# Patient Record
Sex: FEMALE | Race: WHITE | URBAN - METROPOLITAN AREA
[De-identification: names, ages, dates, MRNs, and addresses within clinical notes are randomized per-mention and may not be internally consistent; named-entity substitution may affect disease eponyms.]

---

## 2021-11-04 ENCOUNTER — TELEPHONE (OUTPATIENT)
Dept: INTERNAL MEDICINE CLINIC | Age: 51
End: 2021-11-04

## 2021-11-04 NOTE — TELEPHONE ENCOUNTER
Dr. Betty Jones is willing to see patient at end of day on 11/5 in person, patient declined and will keep VV on 12/3.

## 2021-11-04 NOTE — TELEPHONE ENCOUNTER
----- Message from Estella Renae sent at 11/4/2021  1:35 PM EDT -----  Subject: Message to Provider    QUESTIONS  Information for Provider? Dr. oPlina Sawant is aware providers cannot   prescribe meds for a pt if pt has not been seen yet. In follow up to our   prior message; pt is going to be without important psych medications for a   few weeks since the soonest appt available is the 12/3 appt [facility] scheduled   her for. 1. Please add pt to cancellation list or let her know if she can   be seen sooner. 2. Please reach out to pt ASAP & let her know if ER would   be her best bet for emergency med coverage until her new pt appt, or any   other option  ---------------------------------------------------------------------------  --------------  CALL BACK INFO  What is the best way for the office to contact you? OK to leave message on   voicemail  Preferred Call Back Phone Number? [phone]  ---------------------------------------------------------------------------  --------------  SCRIPT ANSWERS  Relationship to Patient? Third Party  Representative Name?  Eleonora ([facility])

## 2021-11-04 NOTE — TELEPHONE ENCOUNTER
----- Message from anfix Showers sent at 11/4/2021  1:12 PM EDT -----  Subject: Message to Provider    QUESTIONS  Information for Provider? Pt has VV on 12/03/2021, she is requesting it is   sent to her email instead of her phone ([email or web address]). ---------------------------------------------------------------------------  --------------  Cuca ANDalyze JANETT  What is the best way for the office to contact you? Do not leave any   message, patient will call back for answer  Preferred Call Back Phone Number? [phone]  ---------------------------------------------------------------------------  --------------  SCRIPT ANSWERS  Relationship to Patient?  Self

## 2021-11-04 NOTE — TELEPHONE ENCOUNTER
----- [facility] sent at 11/4/2021  1:27 PM EDT -----  Subject: Message to Provider    QUESTIONS  Information for Provider? URGENT? Dr. Cyndie Ralph, pt is a new pt and is   mentally disabled. She lost her psychiatrist when she lost her old   insurance. Pt is having much trouble finding a new psych in her network, &   needs a PCP who can cover her meds until she has new psych. Pt stating she   went without meds before & it was terrifying, scared of it happening   again. She is getting low, has 4 days left. Sertraline 100mg, Quetiapine   200mg, Clonazepam 2 mg tabs. Please call pt & let her know options for   emergency meds until 12/3 appt.  ---------------------------------------------------------------------------  --------------  CALL BACK INFO  What is the best way for the office to contact you? OK to leave message on   voicemail  Preferred Call Back Phone Number? [phone]  ---------------------------------------------------------------------------  --------------  SCRIPT ANSWERS  Relationship to Patient?  Self

## 2021-11-05 ENCOUNTER — TELEPHONE (OUTPATIENT)
Dept: INTERNAL MEDICINE CLINIC | Age: 51
End: 2021-11-05

## 2021-11-05 NOTE — TELEPHONE ENCOUNTER
----- Message from Johnny Muse sent at 11/5/2021  4:45 PM EDT -----  Subject: Appointment Request    Reason for Call: New Patient Request Appointment    QUESTIONS  Type of Appointment? New Patient/New to Provider  Reason for appointment request? Available appointments did not meet   patient need  Additional Information for Provider? Pt has appt scheduled for 12/03/21 to   est. care and med check. Pt has anxiety/PTSD and is worried about running   out of meds and would like an appt sooner if possible.  ---------------------------------------------------------------------------  --------------  CALL BACK INFO  What is the best way for the office to contact you? OK to leave message on   voicemail  Preferred Call Back Phone Number? [phone]  ---------------------------------------------------------------------------  --------------  SCRIPT ANSWERS  Relationship to Patient? Self  Have your symptoms changed? No  Have you been diagnosed with, awaiting test results for, or told that you   are suspected of having COVID-19 (Coronavirus)? (If patient has tested   negative or was tested as a requirement for work, school, or travel and   not based on symptoms, answer no)? No  Within the past two weeks have you developed any of the following symptoms   (answer no if symptoms have been present longer than 2 weeks or began   more than 2 weeks ago)? Fever or Chills, Cough, Shortness of breath or   difficulty breathing, Loss of taste or smell, Sore throat, Nasal   congestion, Sneezing or runny nose, Fatigue or generalized body aches   (answer no if pain is specific to a body part e.g. back pain), Diarrhea,   Headache? No  Have you had close contact with someone with COVID-19 in the last 14 days? No  (Service Expert  click yes below to proceed with [employer] As Usual   Scheduling)?  Yes

## 2022-02-08 ENCOUNTER — OFFICE VISIT (OUTPATIENT)
Dept: INTERNAL MEDICINE CLINIC | Age: 52
End: 2022-02-08
Payer: MEDICARE

## 2022-02-08 VITALS
RESPIRATION RATE: 18 BRPM | HEIGHT: 62 IN | HEART RATE: 110 BPM | WEIGHT: 216 LBS | SYSTOLIC BLOOD PRESSURE: 133 MMHG | DIASTOLIC BLOOD PRESSURE: 85 MMHG | BODY MASS INDEX: 39.75 KG/M2 | TEMPERATURE: 98.5 F | OXYGEN SATURATION: 95 %

## 2022-02-08 DIAGNOSIS — R35.0 URINE FREQUENCY: Primary | ICD-10-CM

## 2022-02-08 DIAGNOSIS — F33.2 SEVERE EPISODE OF RECURRENT MAJOR DEPRESSIVE DISORDER, WITHOUT PSYCHOTIC FEATURES (HCC): ICD-10-CM

## 2022-02-08 DIAGNOSIS — R68.89 OTHER GENERAL SYMPTOMS AND SIGNS: ICD-10-CM

## 2022-02-08 DIAGNOSIS — M89.9 DISORDER OF BONE, UNSPECIFIED: ICD-10-CM

## 2022-02-08 DIAGNOSIS — Z00.00 WELL ADULT HEALTH CHECK: ICD-10-CM

## 2022-02-08 DIAGNOSIS — Z98.84 HISTORY OF GASTRIC BYPASS: ICD-10-CM

## 2022-02-08 DIAGNOSIS — F43.10 PTSD (POST-TRAUMATIC STRESS DISORDER): ICD-10-CM

## 2022-02-08 DIAGNOSIS — R21 RASH AND NONSPECIFIC SKIN ERUPTION: ICD-10-CM

## 2022-02-08 DIAGNOSIS — R79.9 ABNORMAL FINDING OF BLOOD CHEMISTRY, UNSPECIFIED: ICD-10-CM

## 2022-02-08 DIAGNOSIS — F41.0 PANIC DISORDER: ICD-10-CM

## 2022-02-08 LAB
BILIRUB UR QL STRIP: NEGATIVE
GLUCOSE UR-MCNC: NEGATIVE MG/DL
KETONES P FAST UR STRIP-MCNC: NEGATIVE MG/DL
PH UR STRIP: 5 [PH] (ref 4.6–8)
PROT UR QL STRIP: NEGATIVE
SP GR UR STRIP: 1.02 (ref 1–1.03)
UA UROBILINOGEN AMB POC: NORMAL (ref 0.2–1)
URINALYSIS CLARITY POC: CLEAR
URINALYSIS COLOR POC: YELLOW
URINE BLOOD POC: NEGATIVE
URINE LEUKOCYTES POC: NEGATIVE
URINE NITRITES POC: POSITIVE

## 2022-02-08 PROCEDURE — 99204 OFFICE O/P NEW MOD 45 MIN: CPT | Performed by: INTERNAL MEDICINE

## 2022-02-08 PROCEDURE — G9717 DOC PT DX DEP/BP F/U NT REQ: HCPCS | Performed by: INTERNAL MEDICINE

## 2022-02-08 PROCEDURE — 3017F COLORECTAL CA SCREEN DOC REV: CPT | Performed by: INTERNAL MEDICINE

## 2022-02-08 PROCEDURE — G8417 CALC BMI ABV UP PARAM F/U: HCPCS | Performed by: INTERNAL MEDICINE

## 2022-02-08 PROCEDURE — G8427 DOCREV CUR MEDS BY ELIG CLIN: HCPCS | Performed by: INTERNAL MEDICINE

## 2022-02-08 PROCEDURE — 81002 URINALYSIS NONAUTO W/O SCOPE: CPT | Performed by: INTERNAL MEDICINE

## 2022-02-08 RX ORDER — SERTRALINE HYDROCHLORIDE 100 MG/1
200 TABLET, FILM COATED ORAL DAILY
Qty: 180 TABLET | Refills: 0 | Status: SHIPPED | OUTPATIENT
Start: 2022-02-08 | End: 2022-04-24

## 2022-02-08 RX ORDER — QUETIAPINE FUMARATE 200 MG/1
200 TABLET, FILM COATED ORAL 3 TIMES DAILY
Qty: 270 TABLET | Refills: 0 | Status: SHIPPED | OUTPATIENT
Start: 2022-02-08 | End: 2022-04-24

## 2022-02-08 RX ORDER — QUETIAPINE FUMARATE 200 MG/1
TABLET, FILM COATED ORAL
COMMUNITY
Start: 2009-12-02 | End: 2022-02-08 | Stop reason: SDUPTHER

## 2022-02-08 NOTE — PROGRESS NOTES
Lauren Godinez (: 1970) is a 46 y.o. female, new patient, here for evaluation of the following chief complaint(s):  Chief Complaint   Patient presents with    New Patient     rash, urine frequency/pain       Assessment and Plan:       ICD-10-CM ICD-9-CM    1. Urine frequency  R35.0 788.41 AMB POC URINALYSIS DIP STICK MANUAL W/O MICRO   2. Severe episode of recurrent major depressive disorder, without psychotic features ([facility]  F33.2 296.33 sertraline (Zoloft) 100 mg tablet      QUEtiapine (SEROquel) 200 mg tablet      REFERRAL TO BEHAVIORAL HEALTH      REFERRAL TO BEHAVIORAL HEALTH      CBC WITH AUTOMATED DIFF      METABOLIC PANEL, COMPREHENSIVE      IRON PROFILE      FERRITIN      FOLATE      VITAMIN B12      VITAMIN D, 25 HYDROXY      VITAMIN B1, WHOLE BLOOD      VITAMIN B1, WHOLE BLOOD      VITAMIN D, 25 HYDROXY      VITAMIN B12      FOLATE      FERRITIN      IRON PROFILE      METABOLIC PANEL, COMPREHENSIVE      CBC WITH AUTOMATED DIFF   3. Panic disorder  F41.0 300.01 sertraline (Zoloft) 100 mg tablet      QUEtiapine (SEROquel) 200 mg tablet      REFERRAL TO BEHAVIORAL HEALTH      REFERRAL TO BEHAVIORAL HEALTH   4. PTSD (post-traumatic stress disorder)  F43.10 309.81 sertraline (Zoloft) 100 mg tablet      QUEtiapine (SEROquel) 200 mg tablet      REFERRAL TO BEHAVIORAL HEALTH      REFERRAL TO BEHAVIORAL HEALTH   5. Rash and nonspecific skin eruption  R21 782.1 REFERRAL TO DERMATOLOGY   6. History of gastric bypass  Z98.84 V45.86 REFERRAL TO GASTROENTEROLOGY      CBC WITH AUTOMATED DIFF      METABOLIC PANEL, COMPREHENSIVE      IRON PROFILE      FERRITIN      FOLATE      VITAMIN B12      HEMOGLOBIN A1C WITH EAG      VITAMIN D, 25 HYDROXY      VITAMIN B1, WHOLE BLOOD      VITAMIN B1, WHOLE BLOOD      VITAMIN D, 25 HYDROXY      HEMOGLOBIN A1C WITH EAG      VITAMIN B12      FOLATE      FERRITIN      IRON PROFILE      METABOLIC PANEL, COMPREHENSIVE      CBC WITH AUTOMATED DIFF   7.  Well adult health check Z00.00 V70.0 CBC WITH AUTOMATED DIFF      METABOLIC PANEL, COMPREHENSIVE      IRON PROFILE      FERRITIN      HEMOGLOBIN A1C WITH EAG      HEMOGLOBIN A1C WITH EAG      FERRITIN      IRON PROFILE      METABOLIC PANEL, COMPREHENSIVE      CBC WITH AUTOMATED DIFF   8. Abnormal finding of blood chemistry, unspecified   R79.9 790.6 IRON PROFILE      FERRITIN      FOLATE      VITAMIN B12      HEMOGLOBIN A1C WITH EAG      VITAMIN D, 25 HYDROXY      VITAMIN B1, WHOLE BLOOD      VITAMIN B1, WHOLE BLOOD      VITAMIN D, 25 HYDROXY      HEMOGLOBIN A1C WITH EAG      VITAMIN B12      FOLATE      FERRITIN      IRON PROFILE   9. Other general symptoms and signs   R68.89 780.99 FOLATE      VITAMIN B12      VITAMIN B1, WHOLE BLOOD      VITAMIN B1, WHOLE BLOOD      VITAMIN B12      FOLATE   10. Disorder of bone, unspecified   M89.9 733.90 VITAMIN D, 25 HYDROXY      VITAMIN D, 25 HYDROXY       1. Testing reviewed at visit. Normal UA.    2-4:  Refills and referrals reviewed. Referral(s) and referral coordination reviewed with patient at visit. 5.  Referral(s) and referral coordination reviewed with patient at visit. 6-10:  Lab monitoring reviewed. Referral(s) and referral coordination reviewed with patient at visit. Follow-up and Dispositions    · Return in about 2 months (around 4/8/2022) for medication follow-up, referral follow-up.       lab results and schedule of future lab studies reviewed with patient  reviewed medications and side effects in detail    For additional documentation of information and/or recommendations discussed this visit, please see notes in instructions. Plan and evaluation (above) reviewed with pt at visit  Patient voiced understanding of plan and provided with time to ask/review questions. After Visit Summary (AVS) provided to pt after visit with additional instructions as needed/reviewed.       Future Appointments   Date Time Provider Florencia Porter   4/21/2022  9:00 AM Yulia Flood Neal Castillo MD [facility]   --Updated future visits after patient check-out. History of Present Illness:     Notes (nursing/rooming note copied below in italics):  As above. Here to establish care. Pt has records in [facility] communication regarding appt for [address] medication refills. Records reviewed at visit as below:  --Notes indicate she could have been seen 11/5 but chose to keep 12/5 appt. That appt per chart, was cancelled/re-scheduled by the pt. Appt was with Dr. Nick Sheth. She notes above was a general provider, but she notes couldn't get there related to transportation. She [facility] not been [facility] for one year. She notes has good control with sertraline and quetiapine alone. Reviewed do not typically prescribe high-dose clonazepam, and she is OK with this. She notes she is disabled related to her [address] diagnoses. Notes depression and PTSD. The clonazepam helps when she has more anxiety. She has tried [facility], and has not been able to be seen there. She has bottles for sertraline from 10-6-21 for 30-day supply at dose prescribed above. She has bottle of quetiapine for 30-day supply from 10-3-21. Both scripts from Sonny Sandra--NP. She was at [facility]. She was not able to take her with new provider at [address] [place] [address]. She has list from her insurance of covered providers. [facility]         Prescription Monitoring Program ([place]) database query with fills:  Filled  Written  Sold  ID  Drug  QTY  Days  Prescriber  RX #  Dispenser  Refill  Daily Dose*  Pymt Type       08/13/2021 08/13/2021   1 * Channel + Iii Full Spectrum Extract Cartridge   0.50  1  Ch Pur  [id number]  Gre ([id number])  0   [place]     08/13/2021 08/13/2021   1 * Mandarin Branch Full Spectrum Extract Cartridge   0.50  1  Ch Pur  [id number]  Gre ([id number])  0   [place]     08/13/2021 08/13/2021   1 * Tangerine Ii Medicated Chews   0.10  1  Ch Pur  [id number]  Gre ([id number])  0   [place]     08/13/2021 08/13/2021   1 * Gshers Live Rosin Cartridge   0.50  1  Ch Pur  [id number]  Gre ([id number])  0   [place]     08/13/2021 08/13/2021   1 * Fransico Leach Ii Full Spectrum Extract Cartridge   0.50  1  Ch Pur  [id number]  Gre ([id number])  0   Private Pay  [address] [facility]     08/06/2021 08/05/2021   1  Clonazepam 2 Mg Tablet   90.00  30  Br Annie  [id number]  Hum ([id number])  0  12.00 LME  [place]ic[place]  [place]     07/20/2021 05/20/2021   2  Clonazepam 2 Mg Tablet   90.00  30  Br Annie  [id number]  Lidia ([id number])  2  12.00 [facility]  Private Pay  [address] [facility]     06/21/2021 05/20/2021   2  Clonazepam 2 Mg Tablet   90.00  30  Br Annie  [id number]           Name  [address]  Phone     Mellissa Avina  [address] [place] [address]  [phone]  [address]         She has not had to do referrals with her insurance since she was a teenager. She notes history of left ankle fracture in 2001. She stated rash started after that. She saw derm and other general providers since then. Dermatology recommended antibiotics, and she has had antibiotics for this in past as only therapy. Dermatology recommended antibiotic. Referral reviewed with pt at visit. Nursing screenings reviewed by provider at visit. Prior to Admission medications    Medication Sig Start Date End Date Taking? Authorizing Provider   QUEtiapine (SEROquel) 200 mg tablet  12/2/09   Provider, Historical   sertraline (ZOLOFT) 100 mg tablet Take  by mouth daily. Patient not taking: Reported on 2/8/2022    Provider, Historical   CLONAZEPAM PO Take  by mouth.   Patient not taking: Reported on 2/8/2022    Provider, Historical        ROS    Vitals:    02/08/22 0943 02/08/22 0944   BP: (!) 144/82 133/85   Pulse: (!) 110    Resp: 18    Temp: 98.5 °F (36.9 °C)    TempSrc: Oral    SpO2: 95%    Weight: 216 lb (98 kg)    Height: 5' 2\" (1.575 m) Body mass index is 39.51 kg/m². Physical Exam:     Physical Exam  Vitals and nursing note reviewed. Constitutional:       General: She is not in acute distress. Appearance: Normal appearance. She is well-developed. She is not diaphoretic. HENT:      Head: Normocephalic and atraumatic. Mouth/Throat:      Mouth: Mucous membranes are moist.   Eyes:      General: No scleral icterus. Right eye: No discharge. Left eye: No discharge. Conjunctiva/sclera: Conjunctivae normal.   Cardiovascular:      Rate and Rhythm: Normal rate and regular rhythm. Pulses: Normal pulses. Heart sounds: Normal heart sounds. No murmur heard. No friction rub. No gallop. Pulmonary:      Effort: Pulmonary effort is normal. No respiratory distress. Breath sounds: Normal breath sounds. No stridor. No wheezing, rhonchi or rales. Abdominal:      General: Bowel sounds are normal. There is no distension. Palpations: Abdomen is soft. Tenderness: There is no abdominal tenderness. There is no guarding. Musculoskeletal:         General: No swelling, tenderness, deformity or signs of injury. Right lower leg: No edema. Left lower leg: No edema. Skin:     General: Skin is warm. Coloration: Skin is not jaundiced or pale. Findings: Rash present. No bruising or erythema. Comments: #3-4 scattered, ~1cm scabbed lesions left > right forearm. Mild itching during visit. Neurological:      General: No focal deficit present. Mental Status: She is alert. Motor: No abnormal muscle tone. Coordination: Coordination normal.      Gait: Gait normal.   Psychiatric:         Mood and Affect: Mood normal.         Behavior: Behavior normal.         Thought Content: Thought content normal.         Judgment: Judgment normal.         An electronic signature was used to authenticate this note.   -- Lucia Montoya MD

## 2022-02-08 NOTE — PATIENT INSTRUCTIONS
1.  Please follow the following instructions to process/authorize your referral, if needed:    Referrals processing  Please verify with your insurance IF you need referral authorization submitted. For insurance plans which require this, please follow the following steps. FAILURE TO DO SO MAY RESULT IN INABILITY TO SEE THE SPECIALIST YOU HAVE BEEN REFERRED TO (once you are scheduled to see them). 1. Call and schedule appointment with specialist  2. Call our clinic and leave message with provider name, and date of appointment  3. We will then submit the referral to your insurance. This process takes 2-5 business days. If you have questions about scheduling or authorizing referral, you can review with our referral coordinator. You can review with her today if available/if you have time, or you can call to review once you have made your referral/appointment. If you are not sure if you need referral authorizations, please review with the referral coordinator(s), either prior to or after you have made the appointment, as reviewed. 2.  If you need help finding a dermatologist covered by your insurance    --You can call [facility], [facility] or [facility], for dermatology evaluation      --You can also contact your insurance to see which providers are covered prior to calling for an appointment. The dermatologist(s) (in the group with [employer]) will only see patients with skin cancer.

## 2022-02-08 NOTE — PROGRESS NOTES
rm 17    Chief Complaint   Patient presents with    New Patient     1. Have you been to the ER, urgent care clinic since your last visit? Hospitalized since your last visit? No    2. Have you seen or consulted any other health care providers outside of the [address] [place] [address] since your last visit? Include any pap smears or colon screening.  Yes Where: last pcp     Visit Vitals  /85   Pulse (!) 110   Temp 98.5 °F (36.9 °C) (Oral)   Resp 18   Ht 5' 2\" (1.575 m)   Wt 216 lb (98 kg)   SpO2 95%   BMI 39.51 kg/m²

## 2022-02-09 LAB
25(OH)D3 SERPL-MCNC: <9 NG/ML (ref 30–100)
ALBUMIN SERPL-MCNC: 4.1 G/DL (ref 3.5–5)
ALBUMIN/GLOB SERPL: 1.3 {RATIO} (ref 1.1–2.2)
ALP SERPL-CCNC: 196 U/L (ref 45–117)
ALT SERPL-CCNC: 25 U/L (ref 12–78)
ANION GAP SERPL CALC-SCNC: 7 MMOL/L (ref 5–15)
AST SERPL-CCNC: 23 U/L (ref 15–37)
BASOPHILS # BLD: 0.1 K/UL (ref 0–0.1)
BASOPHILS NFR BLD: 1 % (ref 0–1)
BILIRUB SERPL-MCNC: 0.3 MG/DL (ref 0.2–1)
BUN SERPL-MCNC: 9 MG/DL (ref 6–20)
BUN/CREAT SERPL: 15 (ref 12–20)
CALCIUM SERPL-MCNC: 9.9 MG/DL (ref 8.5–10.1)
CHLORIDE SERPL-SCNC: 107 MMOL/L (ref 97–108)
CO2 SERPL-SCNC: 23 MMOL/L (ref 21–32)
CREAT SERPL-MCNC: 0.62 MG/DL (ref 0.55–1.02)
DIFFERENTIAL METHOD BLD: ABNORMAL
EOSINOPHIL # BLD: 0.4 K/UL (ref 0–0.4)
EOSINOPHIL NFR BLD: 4 % (ref 0–7)
ERYTHROCYTE [DISTWIDTH] IN BLOOD BY AUTOMATED COUNT: 20.7 % (ref 11.5–14.5)
EST. AVERAGE GLUCOSE BLD GHB EST-MCNC: 114 MG/DL
FERRITIN SERPL-MCNC: 2 NG/ML (ref 26–388)
FOLATE SERPL-MCNC: 16.1 NG/ML (ref 5–21)
GLOBULIN SER CALC-MCNC: 3.2 G/DL (ref 2–4)
GLUCOSE SERPL-MCNC: 91 MG/DL (ref 65–100)
HBA1C MFR BLD: 5.6 % (ref 4–5.6)
HCT VFR BLD AUTO: 35.7 % (ref 35–47)
HGB BLD-MCNC: 8.9 G/DL (ref 11.5–16)
IMM GRANULOCYTES # BLD AUTO: 0 K/UL (ref 0–0.04)
IMM GRANULOCYTES NFR BLD AUTO: 0 % (ref 0–0.5)
IRON SATN MFR SERPL: 2 % (ref 20–50)
IRON SERPL-MCNC: 13 UG/DL (ref 35–150)
LYMPHOCYTES # BLD: 1.6 K/UL (ref 0.8–3.5)
LYMPHOCYTES NFR BLD: 16 % (ref 12–49)
MCH RBC QN AUTO: 16.1 PG (ref 26–34)
MCHC RBC AUTO-ENTMCNC: 24.9 G/DL (ref 30–36.5)
MCV RBC AUTO: 64.7 FL (ref 80–99)
MONOCYTES # BLD: 0.7 K/UL (ref 0–1)
MONOCYTES NFR BLD: 7 % (ref 5–13)
NEUTS SEG # BLD: 6.9 K/UL (ref 1.8–8)
NEUTS SEG NFR BLD: 72 % (ref 32–75)
NRBC # BLD: 0 K/UL (ref 0–0.01)
NRBC BLD-RTO: 0 PER 100 WBC
PLATELET # BLD AUTO: 658 K/UL (ref 150–400)
PMV BLD AUTO: 9.7 FL (ref 8.9–12.9)
POTASSIUM SERPL-SCNC: 4.4 MMOL/L (ref 3.5–5.1)
PROT SERPL-MCNC: 7.3 G/DL (ref 6.4–8.2)
RBC # BLD AUTO: 5.52 M/UL (ref 3.8–5.2)
RBC MORPH BLD: ABNORMAL
SODIUM SERPL-SCNC: 137 MMOL/L (ref 136–145)
TIBC SERPL-MCNC: 551 UG/DL (ref 250–450)
VIT B12 SERPL-MCNC: 393 PG/ML (ref 193–986)
WBC # BLD AUTO: 9.7 K/UL (ref 3.6–11)

## 2022-02-14 RX ORDER — LANOLIN ALCOHOL/MO/W.PET/CERES
CREAM (GRAM) TOPICAL
Qty: 60 TABLET | Refills: 2 | Status: SHIPPED | OUTPATIENT
Start: 2022-02-14

## 2022-02-14 RX ORDER — ERGOCALCIFEROL 1.25 MG/1
50000 CAPSULE ORAL
Qty: 12 CAPSULE | Refills: 0 | Status: SHIPPED | OUTPATIENT
Start: 2022-02-14 | End: 2022-05-03

## 2022-02-16 LAB — VIT B1 BLD-SCNC: 214 NMOL/L (ref 66.5–200)

## 2022-04-03 DIAGNOSIS — F43.10 PTSD (POST-TRAUMATIC STRESS DISORDER): ICD-10-CM

## 2022-04-03 DIAGNOSIS — F33.2 SEVERE EPISODE OF RECURRENT MAJOR DEPRESSIVE DISORDER, WITHOUT PSYCHOTIC FEATURES (HCC): ICD-10-CM

## 2022-04-03 DIAGNOSIS — F41.0 PANIC DISORDER: ICD-10-CM

## 2022-04-05 DIAGNOSIS — F43.10 PTSD (POST-TRAUMATIC STRESS DISORDER): ICD-10-CM

## 2022-04-05 DIAGNOSIS — F33.2 SEVERE EPISODE OF RECURRENT MAJOR DEPRESSIVE DISORDER, WITHOUT PSYCHOTIC FEATURES (HCC): ICD-10-CM

## 2022-04-05 DIAGNOSIS — F41.0 PANIC DISORDER: ICD-10-CM

## 2022-04-06 ENCOUNTER — TELEPHONE (OUTPATIENT)
Dept: INTERNAL MEDICINE CLINIC | Age: 52
End: 2022-04-06

## 2022-04-06 DIAGNOSIS — F41.0 PANIC DISORDER: ICD-10-CM

## 2022-04-06 DIAGNOSIS — F43.10 PTSD (POST-TRAUMATIC STRESS DISORDER): ICD-10-CM

## 2022-04-06 DIAGNOSIS — F33.2 SEVERE EPISODE OF RECURRENT MAJOR DEPRESSIVE DISORDER, WITHOUT PSYCHOTIC FEATURES (HCC): ICD-10-CM

## 2022-04-06 RX ORDER — QUETIAPINE FUMARATE 200 MG/1
200 TABLET, FILM COATED ORAL 3 TIMES DAILY
Qty: 270 TABLET | Refills: 0 | OUTPATIENT
Start: 2022-04-06

## 2022-04-06 RX ORDER — SERTRALINE HYDROCHLORIDE 100 MG/1
200 TABLET, FILM COATED ORAL DAILY
Qty: 180 TABLET | Refills: 0 | OUTPATIENT
Start: 2022-04-06

## 2022-04-06 NOTE — TELEPHONE ENCOUNTER
Future Appointments:  Future Appointments   Date Time Provider Florencia Porter   4/12/2022  4:15 PM Haim Kendall MD [facility] AM[facility]        Last Appointment With Me:  2/8/2022     Patient called to get refills on zoloft and seroquel. Patient still has about 30 days remaining of medication however she gets very anxious using the mail pharmacy that she would go without meds. She also stated she feels the doses might need to be increased. I booked the above VV appt to discuss dosing and refills.

## 2022-04-10 RX ORDER — SERTRALINE HYDROCHLORIDE 100 MG/1
TABLET, FILM COATED ORAL
Qty: 180 TABLET | Refills: 0 | OUTPATIENT
Start: 2022-04-10

## 2022-04-10 RX ORDER — QUETIAPINE FUMARATE 200 MG/1
TABLET, FILM COATED ORAL
Qty: 270 TABLET | Refills: 0 | OUTPATIENT
Start: 2022-04-10

## 2022-04-12 ENCOUNTER — VIRTUAL VISIT (OUTPATIENT)
Dept: INTERNAL MEDICINE CLINIC | Age: 52
End: 2022-04-12
Payer: MEDICARE

## 2022-04-12 DIAGNOSIS — M54.41 ACUTE RIGHT-SIDED LOW BACK PAIN WITH RIGHT-SIDED SCIATICA: Primary | ICD-10-CM

## 2022-04-12 DIAGNOSIS — F33.2 SEVERE EPISODE OF RECURRENT MAJOR DEPRESSIVE DISORDER, WITHOUT PSYCHOTIC FEATURES (HCC): ICD-10-CM

## 2022-04-12 DIAGNOSIS — F43.10 PTSD (POST-TRAUMATIC STRESS DISORDER): ICD-10-CM

## 2022-04-12 DIAGNOSIS — F41.0 PANIC DISORDER: ICD-10-CM

## 2022-04-12 PROCEDURE — 3017F COLORECTAL CA SCREEN DOC REV: CPT | Performed by: INTERNAL MEDICINE

## 2022-04-12 PROCEDURE — 99214 OFFICE O/P EST MOD 30 MIN: CPT | Performed by: INTERNAL MEDICINE

## 2022-04-12 PROCEDURE — G9717 DOC PT DX DEP/BP F/U NT REQ: HCPCS | Performed by: INTERNAL MEDICINE

## 2022-04-12 PROCEDURE — G8427 DOCREV CUR MEDS BY ELIG CLIN: HCPCS | Performed by: INTERNAL MEDICINE

## 2022-04-12 RX ORDER — CYCLOBENZAPRINE HCL 10 MG
10 TABLET ORAL
Qty: 30 TABLET | Refills: 1 | Status: SHIPPED | OUTPATIENT
Start: 2022-04-12 | End: 2022-07-02

## 2022-04-12 RX ORDER — NAPROXEN 500 MG/1
500 TABLET ORAL 2 TIMES DAILY WITH MEALS
Qty: 14 TABLET | Refills: 0 | Status: SHIPPED | OUTPATIENT
Start: 2022-04-12 | End: 2022-04-24

## 2022-04-12 RX ORDER — NAPROXEN 500 MG/1
500 TABLET ORAL 2 TIMES DAILY WITH MEALS
Qty: 30 TABLET | Refills: 1 | Status: SHIPPED | OUTPATIENT
Start: 2022-04-12 | End: 2022-04-12 | Stop reason: SDUPTHER

## 2022-04-12 RX ORDER — CYCLOBENZAPRINE HCL 10 MG
10 TABLET ORAL
Qty: 30 TABLET | Refills: 1 | Status: SHIPPED | OUTPATIENT
Start: 2022-04-12 | End: 2022-04-12 | Stop reason: SDUPTHER

## 2022-04-12 NOTE — PROGRESS NOTES
Osmel Kidd (: 1970) is a 46 y.o. female, established patient, here for evaluation of the following chief complaint(s)--see below:    Osmel Kidd is a 46 y.o. female who was seen by synchronous (real-time) audio-video technology on 2022. Consent: Osmel Kidd, who was seen by synchronous (real-time) audio-video technology, and/or her healthcare decision maker, is aware that this patient-initiated, Telehealth encounter on 2022 is a billable service, with coverage as determined by her insurance carrier. She is aware that she may receive a bill and has provided verbal consent to proceed: Yes. I was in the office while conducting this encounter. Assessment & Plan:   Diagnoses and all orders for this visit:      ICD-10-CM ICD-9-CM    1. Acute right-sided low back pain with right-sided sciatica  M54.41 724.2 cyclobenzaprine (FLEXERIL) 10 mg tablet     724.3 naproxen (NAPROSYN) 500 mg tablet               2. BMI 39.0-39.9,adult  Z68.39 V85.39 REFERRAL TO BARIATRIC SURGERY   3. PTSD (post-traumatic stress disorder)  F43.10 309.81 REFERRAL TO BEHAVIORAL HEALTH   4. Severe episode of recurrent major depressive disorder, without psychotic features ([facility] [address]  F33.2 296.33 REFERRAL TO BEHAVIORAL HEALTH   5. Panic disorder  F41.0 300.01 REFERRAL TO BEHAVIORAL HEALTH       1. Medication(s), management and follow-up based on response reviewed at visit. Reviewed typical course of illness, duration of symptoms, and exam findings. Symptomatic management reviewed at visit. 2.  Referral(s) and referral coordination reviewed with patient at visit.    3,4,5:  Referral(s) and referral coordination reviewed with patient at visit. SW coordination/assistance reviewed with pt as well. Follow-up and Dispositions    · Return if symptoms worsen or fail to improve.        reviewed medications and side effects in detail    Plan and evaluation (above) reviewed with pt at visit  Patient voiced understanding of plan and provided with time to ask/review questions. After Visit Summary (AVS) provided to pt after visit with additional instructions as needed/reviewed. AVS:  [x]  Available to [facility] in [facility] after visit signed. []  Mailed to patient after visit. []  Not sent to patient after visit. No future appointments. --Updated future visits after patient check-out. Subjective:   Tiburcio Gonzalez was seen for:  Chief Complaint   Patient presents with    Medication Evaluation     f/u    Back Pain     x 3 days       Notes (nursing/rooming note): Unable to sleep recently   Crying states having constant anxiety  Used elavil 150mg in the past with low dose benzo  Would like weight loss referral to dr. Olvin Westbrook at [facility]  Threw out back on Saturday unable to walk    Notes:  She has seen Dr. Ena Pierre for back pain/degenerated disk. She has not had back pain like this is some time. She usually would use muscle relaxant, NSAID, and Dilaudid then for 1 week and rest.    Mgt reviewed. Discussed didn't feel comfortable with use of Dilaudid for mgt at this time. Referral to Keiko Smith reviewed. SW messaged after visit as reviewed to assist with MH referral for [facility]. She would like to see Bariatric provider above for recent weight gain. Nursing screenings reviewed by provider at visit. Allergies   Allergen Reactions    Morphine Not Reported This Time       Prior to Admission medications    Medication Sig Start Date End Date Taking? Authorizing Provider   ergocalciferol (ERGOCALCIFEROL) 1,250 mcg (50,000 unit) capsule Take 1 Capsule by mouth every seven (7) days for 12 doses. 2/14/22 5/3/22 Yes Lee Ann Wilson MD   sertraline (Zoloft) 100 mg tablet Take 2 Tablets by mouth daily. 2/8/22  Yes Lee Ann Wilson MD   QUEtiapine (SEROquel) 200 mg tablet Take 1 Tablet by mouth three (3) times daily.  2/8/22  Yes Lee Ann Wilson MD   ferrous sulfate 325 mg (65 mg iron) tablet Take one capsule two times daily on empty stomach with Vitamin C. 2/14/22   Eddi Bettencourt MD   CLONAZEPAM PO Take  by mouth. Patient not taking: Reported on 2/8/2022    Provider, Historical         ROS    PHYSICAL EXAMINATION:    Vital Signs: There were no vitals taken for this visit. No flowsheet data found. Constitutional: [x] Appears well-developed and well-nourished [x] No apparent distress      Mental status: [x] Alert and awake  [x] Oriented [x] Able to follow commands       Eyes:   EOM    [x]  Normal      Sclera  [x]  Normal              Discharge [x]  None visible       HENT: [x] Normocephalic, atraumatic    [x] Mouth/Throat: Mucous membranes are moist    External Ears [x] Normal      Neck: [x] No visualized mass     Pulmonary/Chest: [x] Respiratory effort normal   [x] No visualized signs of difficulty breathing or respiratory distress    Musculoskeletal:  [x] Normal range of motion of neck    Neurological:        [x] No Facial Asymmetry (Cranial nerve 7 motor function) (limited exam due to video visit)          [x] No gaze palsy     Skin:        [x] No significant exanthematous lesions or discoloration noted on facial skin             Psychiatric:       [x] Normal Affect       Other pertinent observable physical exam findings:  None. We discussed the expected course, resolution and complications of the diagnosis(es) in detail. Medication risks, benefits, costs, interactions, and alternatives were discussed as indicated. I advised her to contact the office if her condition worsens, changes or fails to improve as anticipated. She expressed understanding with the diagnosis(es) and plan. Nico Osman is a 46 y.o. female who was evaluated by a video visit encounter for concerns as above. Nico Osman is being evaluated by a Virtual Visit (video visit) encounter to address concerns as mentioned above. A caregiver was present when appropriate.   Due to this being a TeleHealth encounter (During UCDZX-13 public health emergency), evaluation of the following organ systems was limited: Vitals/Constitutional/EENT/Resp/CV/GI//MS/Neuro/Skin/Heme-Lymph-Imm. Pursuant to the emergency declaration under the [address] [place] [address], [address] [place] [address] and the [employer] Act, this Virtual Visit was conducted with patient's (and/or legal guardian's) consent, to reduce the patient's risk of exposure to COVID-19 and provide necessary medical care. The patient (and/or legal guardian) has also been advised to contact this office for worsening conditions or problems, and seek emergency medical treatment and/or call 911 if deemed necessary. Patient identification was verified at the start of the visit: YES. Services were provided through a video synchronous discussion virtually to substitute for in-person clinic visit. Patient was located at their individual home (or other location as per patient preference). Provider was located in medical office. An electronic signature was used to authenticate this note.   -- Corey Soriano MD

## 2022-04-12 NOTE — PROGRESS NOTES
[phone]    Unable to sleep recently   Crying states having constant anxiety  Used elavil 150mg in the past with low dose benzo  Would like weight loss referral to dr. Rosalba Steele at [facility]  Threw out back on Saturday unable to walk    Chief Complaint   Patient presents with    Medication Evaluation     f/u    Back Pain     x 3 days     1. Have you been to the ER, urgent care clinic since your last visit? Hospitalized since your last visit? No    2. Have you seen or consulted any other health care providers outside of the [address] [place] [address] since your last visit? Include any pap smears or colon screening.  No

## 2022-04-16 DIAGNOSIS — F33.2 SEVERE EPISODE OF RECURRENT MAJOR DEPRESSIVE DISORDER, WITHOUT PSYCHOTIC FEATURES (HCC): ICD-10-CM

## 2022-04-16 DIAGNOSIS — F41.0 PANIC DISORDER: ICD-10-CM

## 2022-04-16 DIAGNOSIS — F43.10 PTSD (POST-TRAUMATIC STRESS DISORDER): ICD-10-CM

## 2022-04-18 DIAGNOSIS — M54.41 ACUTE RIGHT-SIDED LOW BACK PAIN WITH RIGHT-SIDED SCIATICA: ICD-10-CM

## 2022-04-19 NOTE — TELEPHONE ENCOUNTER
----- Message from [facility] sent at 4/16/2022  8:24 AM EDT -----  Subject: Message to Provider    QUESTIONS  Information for Provider? Pt stated medication she was prescribed is not   working. She will like a stronger medication due to alot of pain  ---------------------------------------------------------------------------  --------------  CALL BACK INFO  What is the best way for the office to contact you? OK to leave message on   voicemail  Preferred Call Back Phone Number? [phone]  ---------------------------------------------------------------------------  --------------  SCRIPT ANSWERS  Relationship to Patient?  Self

## 2022-04-22 DIAGNOSIS — F33.2 SEVERE EPISODE OF RECURRENT MAJOR DEPRESSIVE DISORDER, WITHOUT PSYCHOTIC FEATURES (HCC): ICD-10-CM

## 2022-04-22 DIAGNOSIS — F41.0 PANIC DISORDER: ICD-10-CM

## 2022-04-22 DIAGNOSIS — F43.10 PTSD (POST-TRAUMATIC STRESS DISORDER): ICD-10-CM

## 2022-04-24 RX ORDER — SERTRALINE HYDROCHLORIDE 100 MG/1
TABLET, FILM COATED ORAL
Qty: 180 TABLET | Refills: 0 | Status: SHIPPED | OUTPATIENT
Start: 2022-04-24

## 2022-04-24 RX ORDER — NAPROXEN 500 MG/1
TABLET ORAL
Qty: 14 TABLET | Refills: 0 | Status: SHIPPED | OUTPATIENT
Start: 2022-04-24 | End: 2022-07-10

## 2022-04-24 RX ORDER — QUETIAPINE FUMARATE 200 MG/1
TABLET, FILM COATED ORAL
Qty: 270 TABLET | Refills: 0 | Status: SHIPPED | OUTPATIENT
Start: 2022-04-24

## 2022-04-25 ENCOUNTER — PATIENT OUTREACH (OUTPATIENT)
Dept: CASE MANAGEMENT | Age: 52
End: 2022-04-25

## 2022-04-25 NOTE — TELEPHONE ENCOUNTER
Refill request(s) approved--quetiapine--90-day supply with 0 refill(s). Referral requested from  to assist with finding [address] provider to manage medications.     Requested Prescriptions     Signed Prescriptions Disp Refills    QUEtiapine (SEROquel) 200 mg tablet 270 Tablet 0     Sig: TAKE 1 TABLET THREE TIMES DAILY     Authorizing Provider: Jasen Gaytan

## 2022-04-25 NOTE — TELEPHONE ENCOUNTER
Refill request(s) approved--sertraline--90-day supply with 0 refill(s).     Requested Prescriptions     Signed Prescriptions Disp Refills    sertraline (ZOLOFT) 100 mg tablet 180 Tablet 0     Sig: TAKE 2 TABLETS EVERY DAY     Authorizing Provider: Julio Anderson

## 2022-04-25 NOTE — PROGRESS NOTES
04/25/22  Referral received from PCP today, attempted to call pt for outreach to introduce self and offer services. Left HIPAA compliant vm to return this ACM's call. -MLL    Ambulatory Care Management Note    Date/Time:  4/27/2022 3:10 PM    This patient was received as a referral from Provider. Ambulatory Care Manager outreached to patient today to offer care management services. Introduction to self and role of care manager provided. Patient accepted care management services at this time. Follow up call scheduled at this time. Patient has Ambulatory Care Manager's contact number for for any questions or concerns. This Ambulatory Care Manager (ACM) reviewed and updated the following screenings during this call; general assessment, self management assessment and SDOH assessments    Patient's challenges to self-management identified:   depression, financial, ineffective coping, level of motivation, medical condition, medication management, PCP relationship, support system and transportation      Medication Management:  good adherence and good understanding    Advance Care Planning:   Does patient have an Advance Directive:  did not discuss today. Pt has no family and no friends. [employer], Referrals, and Durable Medical Equipment: Tried to give pt contact info for Rkylin for [address] [place] [address], she did not want the number. Health Maintenance Due   Topic Date Due    Hepatitis C Screening  Never done    DTaP/Tdap/Td series (1 - Tdap) Never done    Cervical cancer screen  Never done    Lipid Screen  Never done    Colorectal Cancer Screening Combo  Never done    Shingrix Vaccine Age 50> (1 of 2) Never done    Breast Cancer Screen Mammogram  Never done    Medicare Yearly Exam  Never done    COVID-19 Vaccine (3 - Booster for Joelyn Nerissa series) 12/08/2021     Health Maintenance Reviewed: will discuss on later call        PCP/Specialist follow up: No future appointments.    [facility] - Chris Khan NP Monday May 2nd at 3pm  Doxy. me/Shirley      Spoke with pt, she is trying to find a new psychiatry provider. Pt is very frustrated, she is unable to find provider who is accepting new pts and also accepts her insurance. She takes Seroquel, Sertaline, and clonazepam; she has been off of the Clonazepam and has been having severe anxiety. This ACM looked up providers on [address] a Doctor, called "[facility] office, she is leaving, office is not accepting new pts. [address] [facility] in [place], got answering machine (did not leave vm). This ACM called [facility] & spoke with Jena Kim. Pt will be able to get an appoint within 2 weeks after completing paperwork. Called pt back and gave her this info, pt tried to call [facility] and had to leave a vm. Pt anxious/crying/upset. Pt states she has scabs all over her scalp from picking during episodes of anxiety. She is not eat regularly, sometimes vomits after eating due to anxiety. Pt admits she does not leave her apartment ever; uses [facility] for appointments. This ACM called [facility] back and spoke with Jena Kim. Pt was a pt at this office in 2020, so she is in the system; she will have to update her paperwork. Jena Kim sent pt with online forms to complete as well as a link for a VV on Monday May 2nd at 3pm with Chris Khan NP. Called pt back and gave her this info. Pt now crying due to being happy/emotional. Emphasized the importance of signing on to the link before 3 pm, if she is late they will re-schedule; she states her understanding and said she has used this type of VV before. Told her she will be required to have her camera on during the visit. Pt very emotional. Discussed her night terrors, ptsd, and now severe back pain. She has previously had gastric bypass surgery, though in the past 2 years she has gained 70 pounds due to anxiety and stress eating.       This patient was received as a referral from Provider. Top Challenges reviewed with the provider   · Severe anxiety  · Open scabs on scalp  · Socially isolated  · No support system  · Poor nutrition? · Back pain  · Weight gain  · Reported night terrors/ ptsd  · Not taking Clonazepam             Ambulatory  contacted patient for discussion and case management of finding new psych provider to manage her medications   Summary of patients top problems:   1. Severe Anxiety - Pt has debilitating anxiety - she is unable to leave her house/does not go outside, has panic attacks, often vomits after eating, has scabs all over scalp due to picking during anxiety. She is disabled due to her condition. 2. Scabs on scalp - She admits she \"bleached her head\" during the pandemic. She has had scabs/sores on her scalp for the past few months. She does not know what to put on them and does not know how to stop picking/scratching it. She feels once she is back on the Clonazepam she will feel better and they will heal.  3. Back pain - She \"pulled her back\" and has been in severe pain for the past couple of weeks. PCP prescribed Flexeril & Naprosyn which is not relieving it. She has gained a lot of weight and has of degenerative disc disease due to prior career. She said she has not had back pain like this in years. Patient's challenges to self management identified:   depression, financial, functional physical ability, ineffective coping, level of motivation, medical condition, PCP relationship, support system and transportation        Goals       Patient will attend psychiatry vv visits as scheduled (pt-stated)       04/27/22  PCP referred pt to this ACM, pt has been unable to find new psychiatry provider. She is on a regimen of Sertraline, Seroquel, and Clonazepam - she admits it has taken a while for her previous providers to find that \"trifecta\" for her. She is not currently taking the Clonazepam due to no Psych Dr to manage her.  She is having severe anxiety and depression. She has gained 70 lbs over the past couple years, has scabs all over her scalp due to picking, and does not leave her condo. She has no support system. Tried to give her [address] resources, she did not want them. This ACM called and found her new provider with [facility] and she has an appoint ment on Monday May 2nd at 3pm (VV). Pt will:   Attend upcoming VV on Monday May 2nd  This ACM will follow up with pt in 6-7 days. Pt does have this ACM's contact info and was told to call if she needs to talk. -MLL             Patient verbalized understanding of all information discussed. Patient has this Ambulatory Care Manager's contact information for any further questions, concerns, or needs.

## 2022-05-03 ENCOUNTER — PATIENT OUTREACH (OUTPATIENT)
Dept: CASE MANAGEMENT | Age: 52
End: 2022-05-03

## 2022-05-03 NOTE — PROGRESS NOTES
Goals Addressed                    This Visit's Progress      Patient will attend psychiatry vv visits as scheduled (pt-stated)   On track      05/03/22  Pt did attend her VV with Marisela Cutler, new psych provider, yesterday. She said she really liked him and she scheduled a follow up visit with him for 4 weeks, she was unsure of exact date. He did re-order her Clonazepam as well as something to help her sleep (unsure). Prescriptions were ordered through [facility]. Pt sounded much calmer today. She reports her back is feeling a little better; the scabs on her scalp have not changed though pt is not ready to make an appoint for this yet. Discussed with pt doing some stretching exercises and perhaps some simple exercise videos. This ACM will follow up with pt in 7-10 days. -MLL    04/27/22  PCP referred pt to this ACM, pt has been unable to find new psychiatry provider. She is on a regimen of Sertraline, Seroquel, and Clonazepam - she admits it has taken a while for her previous providers to find that \"trifecta\" for her. She is not currently taking the Clonazepam due to no Psych Dr to manage her. She is having severe anxiety and depression. She has gained 70 lbs over the past couple years, has scabs all over her scalp due to picking, and does not leave her condo. She has no support system. Tried to give her [address]  [facility] resources, she did not want them. This ACM called and found her new provider with [facility] and she has an appoint ment on Monday May 2nd at 3pm (VV). Pt will:   Attend upcoming VV on Monday May 2nd  This ACM will follow up with pt in 6-7 days. Pt does have this ACM's contact info and was told to call if she needs to talk.  -MLL

## 2022-05-12 ENCOUNTER — PATIENT OUTREACH (OUTPATIENT)
Dept: CASE MANAGEMENT | Age: 52
End: 2022-05-12

## 2022-05-12 NOTE — PROGRESS NOTES
05/12/22  Attempted to outreach with pt for CM follow up. VM is not set up, unable to leave a message. Will try back tomorrow.  -MLL

## 2022-05-13 ENCOUNTER — PATIENT OUTREACH (OUTPATIENT)
Dept: CASE MANAGEMENT | Age: 52
End: 2022-05-13

## 2022-05-13 NOTE — PROGRESS NOTES
Goals Addressed                    This Visit's Progress      Patient will attend psychiatry vv visits as scheduled (pt-stated)   On track      05/13/22  Pt has not yet received her medications, they have been shipped and she said they should be here on Wed 5/18. She does sound better, she said she is dealing with life one day at a time, one problem at a time. Her next visit with Khanh Mancuso is the first week of June. She is also looking forward to her elderly father moving in with her soon. She admits she is excited about this, though nervous and anxious as well. This ACM will follow up with pt in 1 week. Pt does have my contact info and knows she can call. -MLL    05/03/22  Pt did attend her VV with Griselda Hawthorne, new psych provider, yesterday. She said she really liked him and she scheduled a follow up visit with him for 4 weeks, she was unsure of exact date. He did re-order her Clonazepam as well as something to help her sleep (unsure). Prescriptions were ordered through [facility]. Pt sounded much calmer today. She reports her back is feeling a little better; the scabs on her scalp have not changed though pt is not ready to make an appoint for this yet. Discussed with pt doing some stretching exercises and perhaps some simple exercise videos. This ACM will follow up with pt in 7-10 days. -[facility]    04/27/22  PCP referred pt to this ACM, pt has been unable to find new psychiatry provider. She is on a regimen of Sertraline, Seroquel, and Clonazepam - she admits it has taken a while for her previous providers to find that \"trifecta\" for her. She is not currently taking the Clonazepam due to no Psych Dr to manage her. She is having severe anxiety and depression. She has gained 70 lbs over the past couple years, has scabs all over her scalp due to picking, and does not leave her condo. She has no support system. Tried to give her [address] resources, she did not want them.  This ACM called and found her new provider with [facility] and she has an appoint ment on Monday May 2nd at 3pm (VV). Pt will:   Attend upcoming VV on Monday May 2nd  This ACM will follow up with pt in 6-7 days. Pt does have this ACM's contact info and was told to call if she needs to talk.  -MLL

## 2022-05-31 ENCOUNTER — PATIENT OUTREACH (OUTPATIENT)
Dept: CASE MANAGEMENT | Age: 52
End: 2022-05-31

## 2022-05-31 NOTE — PROGRESS NOTES
05/31/22  Pt left message for this ACM on 5/24/22 regarding a statement she received from [address] Due to family emergency, this ACM just now called pt back. This ACM left HIPAA compliant message for pt to return call. This ACM also sent PCP a message to sent referral to [facility]. -[facility]    Goals Addressed                    This Visit's Progress      Patient will attend psychiatry vv visits as scheduled (pt-stated)         05/31/22  Pt is concerned about not having referral for new psych provider. She had a difficult time finding a psych provider who she \"clicks\" with and she does feel comfortable with Jana Almonte. She does have follow up appoint with him on 6/2. This ACM did send message to PCP to forward referral to [address], suggested to pt that she follow up with PCP to ensure this was done before next psych visit, she said she will do this. Pt's elderly father has moved in with her, she has been apprehensive about this though at the same time is looking forward to improving her relationship with him and spending time with him. This ACM will follow up with pt in 2 weeks. -[facility]  05/13/22  Pt has not yet received her medications, they have been shipped and she said they should be here on Wed 5/18. She does sound better, she said she is dealing with life one day at a time, one problem at a time. Her next visit with Jana Almonte is the first week of June. She is also looking forward to her elderly father moving in with her soon. She admits she is excited about this, though nervous and anxious as well. This ACM will follow up with pt in 1 week. Pt does have my contact info and knows she can call. -[facility]    05/03/22  Pt did attend her VV with Nahed Craft, new psych provider, yesterday. She said she really liked him and she scheduled a follow up visit with him for 4 weeks, she was unsure of exact date. He did re-order her Clonazepam as well as something to help her sleep (unsure).  Prescriptions were ordered through [facility]. Pt sounded much calmer today. She reports her back is feeling a little better; the scabs on her scalp have not changed though pt is not ready to make an appoint for this yet. Discussed with pt doing some stretching exercises and perhaps some simple exercise videos. This ACM will follow up with pt in 7-10 days. -[facility]    04/27/22  PCP referred pt to this ACM, pt has been unable to find new psychiatry provider. She is on a regimen of Sertraline, Seroquel, and Clonazepam - she admits it has taken a while for her previous providers to find that \"trifecta\" for her. She is not currently taking the Clonazepam due to no Psych Dr to manage her. She is having severe anxiety and depression. She has gained 70 lbs over the past couple years, has scabs all over her scalp due to picking, and does not leave her condo. She has no support system. Tried to give her [phone] resources, she did not want them. This ACM called and found her new provider with [facility] and she has an appoint ment on Monday May 2nd at 3pm (VV). Pt will:   Attend upcoming VV on Monday May 2nd  This ACM will follow up with pt in 6-7 days. Pt does have this ACM's contact info and was told to call if she needs to talk.  -[facility]

## 2022-06-20 ENCOUNTER — PATIENT OUTREACH (OUTPATIENT)
Dept: CASE MANAGEMENT | Age: 52
End: 2022-06-20

## 2022-06-20 NOTE — PROGRESS NOTES
06/20/22  Attempted to outreach with pt for CM follow up. Left HIPAA compliant vm to return this ACM's call. -[facility]    Goals Addressed                    This Visit's Progress      Patient will attend psychiatry vv visits as scheduled (pt-stated)   On track      06/20/22  Pt states she is feeling so much better on her new medication regimen. She did mention she is having a difficult time refilling her meds through [facility], one of the meds is listed as \"on hold\". She has [facility] prescribing provider though she was confused because his name is not listed in the menu. We discussed her reaching him through the online portal.   Pt said her father has also noticed her improved moods. She was getting her nails done when this ACM had previously called which she has not done in a while. This ACM will follow up with pt in 2 weeks. -[facility]    05/31/22  Pt is concerned about not having referral for new psych provider. She had a difficult time finding a psych provider who she \"clicks\" with and she does feel comfortable with [facility]. She does have follow up appoint with him on 6/2. This ACM did send message to PCP to forward referral to [address], suggested to pt that she follow up with PCP to ensure this was done before next psych visit, she said she will do this. Pt's elderly father has moved in with her, she has been apprehensive about this though at the same time is looking forward to improving her relationship with him and spending time with him. This ACM will follow up with pt in 2 weeks. -[facility]  05/13/22  Pt has not yet received her medications, they have been shipped and she said they should be here on Wed 5/18. She does sound better, she said she is dealing with life one day at a time, one problem at a time. Her next [facility] is the first week of June. She is also looking forward to her elderly father moving in with her soon.  She admits she is excited about this, though nervous and anxious as well. This ACM will follow up with pt in 1 week. Pt does have my contact info and knows she can call. -[facility]    05/03/22  Pt did attend her VV with Julia Clarke, new psych provider, yesterday. She said she really liked him and she scheduled a follow up visit with him for 4 weeks, she was unsure of exact date. He did re-order her Clonazepam as well as something to help her sleep (unsure). Prescriptions were ordered through [facility]. Pt sounded much calmer today. She reports her back is feeling a little better; the scabs on her scalp have not changed though pt is not ready to make an appoint for this yet. Discussed with pt doing some stretching exercises and perhaps some simple exercise videos. This ACM will follow up with pt in 7-10 days. -[facility]    04/27/22  PCP referred pt to this ACM, pt has been unable to find new psychiatry provider. She is on a regimen of Sertraline, Seroquel, and Clonazepam - she admits it has taken a while for her previous providers to find that \"trifecta\" for her. She is not currently taking the Clonazepam due to no Psych Dr to manage her. She is having severe anxiety and depression. She has gained 70 lbs over the past couple years, has scabs all over her scalp due to picking, and does not leave her condo. She has no support system. Tried to give her [address] [place] [address] resources, she did not want them. This ACM called and found her new provider with [facility] and she has an appoint ment on Monday May 2nd at 3pm (VV). Pt will:   Attend upcoming VV on Monday May 2nd  This ACM will follow up with pt in 6-7 days. Pt does have this ACM's contact info and was told to call if she needs to talk.  -[facility]

## 2022-06-24 DIAGNOSIS — M54.41 ACUTE RIGHT-SIDED LOW BACK PAIN WITH RIGHT-SIDED SCIATICA: ICD-10-CM

## 2022-07-02 RX ORDER — CYCLOBENZAPRINE HCL 10 MG
TABLET ORAL
Qty: 30 TABLET | Refills: 1 | Status: SHIPPED | OUTPATIENT
Start: 2022-07-02 | End: 2022-08-08

## 2022-07-05 DIAGNOSIS — M54.41 ACUTE RIGHT-SIDED LOW BACK PAIN WITH RIGHT-SIDED SCIATICA: ICD-10-CM

## 2022-07-07 DIAGNOSIS — M54.41 ACUTE RIGHT-SIDED LOW BACK PAIN WITH RIGHT-SIDED SCIATICA: ICD-10-CM

## 2022-07-07 RX ORDER — NAPROXEN 500 MG/1
TABLET ORAL
Qty: 14 TABLET | Refills: 0 | Status: CANCELLED | OUTPATIENT
Start: 2022-07-07

## 2022-07-07 NOTE — TELEPHONE ENCOUNTER
Last visit 04/12/2022 Virtual visit MD Stephanie Viera   Next appointment Nothing scheduled   Previous refill encounter(s)   04/24/2022 Naprosyn #14     For Pharmacy Admin Tracking Only     Intervention Detail: New Rx: 1, reason: Patient Preference   Time Spent (min): 5        Requested Prescriptions     Pending Prescriptions Disp Refills    naproxen (NAPROSYN) 500 mg tablet 14 Tablet 0     Sig: TAKE 1 TABLET BY MOUTH 2 TIMES DAILY WITH MEALS FOR 7 DAYS THEN AS NEEDED FOR MUSCULOSKELETAL PAIN.

## 2022-07-08 ENCOUNTER — PATIENT OUTREACH (OUTPATIENT)
Dept: CASE MANAGEMENT | Age: 52
End: 2022-07-08

## 2022-07-10 RX ORDER — NAPROXEN 500 MG/1
TABLET ORAL
Qty: 14 TABLET | Refills: 0 | Status: SHIPPED | OUTPATIENT
Start: 2022-07-10

## 2022-07-10 NOTE — TELEPHONE ENCOUNTER
Refill request(s) approved--naproxen for PRN use. Last refilled for #14 with no refills 4/24/22.     Requested Prescriptions     Signed Prescriptions Disp Refills    naproxen (NAPROSYN) 500 mg tablet 14 Tablet 0     Sig: TAKE 1 TABLET BY MOUTH 2 TIMES DAILY WITH MEALS FOR 7 DAYS THEN AS NEEDED FOR MUSCULOSKELETAL PAIN     Authorizing Provider: Lia Ruiz

## 2022-07-12 ENCOUNTER — PATIENT OUTREACH (OUTPATIENT)
Dept: CASE MANAGEMENT | Age: 52
End: 2022-07-12

## 2022-07-12 NOTE — PROGRESS NOTES
Patient has graduated from the Complex Case Management  program on 07/12/22. Patient/family has the ability to self-manage at this time. Care management goals have been completed. No further Ambulatory Care Manager follow up scheduled. Pt does have this AC's contact info for future use if needed. -[facility]    Goals Addressed                    This Visit's Progress      COMPLETED: Patient will attend psychiatry vv visits as scheduled (pt-stated)         07/12/22  Pt continues to have VV with therapist Abad De Paz. She states she is sleeping well now and waking up refreshed and having less anxiety. She feels her medications are finally therapeutic. Pt does have concerns related to her father staying with her related to his own end of life care, pearson etc & family dynamics with her sister. She has a plan to deal with this. -[facility]    06/20/22  Pt states she is feeling so much better on her new medication regimen. She did mention she is having a difficult time refilling her meds through [facility], one of the meds is listed as \"on hold\". She has tried reaching out to Abad De Paz the prescribing provider though she was confused because his name is not listed in the menu. We discussed her reaching him through the online portal.   Pt said her father has also noticed her improved moods. She was getting her nails done when this ACM had previously called which she has not done in a while. This ACM will follow up with pt in 2 weeks. -[facility]    05/31/22  Pt is concerned about not having referral for new psych provider. She had a difficult time finding a psych provider who she \"clicks\" with and she does feel comfortable with Abad De Paz. She does have follow up appoint with him on 6/2. This AC did send message to PCP to forward referral to [address], suggested to pt that she follow up with PCP to ensure this was done before next psych visit, she said she will do this.    Pt's elderly father has moved in with her, she has been apprehensive about this though at the same time is looking forward to improving her relationship with him and spending time with him. This ACM will follow up with pt in 2 weeks. -[facility]  05/13/22  Pt has not yet received her medications, they have been shipped and she said they should be here on Wed 5/18. She does sound better, she said she is dealing with life one day at a time, one problem at a time. Her next visit with Neha Segal is the first week of June. She is also looking forward to her elderly father moving in with her soon. She admits she is excited about this, though nervous and anxious as well. This ACM will follow up with pt in 1 week. Pt does have my contact info and knows she can call. -[facility]    05/03/22  Pt did attend her VV with Edilma Silverio, new psych provider, yesterday. She said she really liked him and she scheduled a follow up visit with him for 4 weeks, she was unsure of exact date. He did re-order her Clonazepam as well as something to help her sleep (unsure). Prescriptions were ordered through [facility]. Pt sounded much calmer today. She reports her back is feeling a little better; the scabs on her scalp have not changed though pt is not ready to make an appoint for this yet. Discussed with pt doing some stretching exercises and perhaps some simple exercise videos. This ACM will follow up with pt in 7-10 days. -[facility]    04/27/22  PCP referred pt to this ACM, pt has been unable to find new psychiatry provider. She is on a regimen of Sertraline, Seroquel, and Clonazepam - she admits it has taken a while for her previous providers to find that \"trifecta\" for her. She is not currently taking the Clonazepam due to no Psych Dr to manage her. She is having severe anxiety and depression. She has gained 70 lbs over the past couple years, has scabs all over her scalp due to picking, and does not leave her condo. She has no support system. Tried to give her [address] [place] [address] resources, she did not want them.  This ACM called and found her new provider with [facility] and she has an appoint ment on Monday May 2nd at 3pm (VV). Pt will:   Attend upcoming VV on Monday May 2nd  This ACM will follow up with pt in 6-7 days. Pt does have this ACM's contact info and was told to call if she needs to talk. -M[facility]            Patient has Ambulatory Care Manager's contact information for any further questions, concerns, or needs. Patients upcoming visits:  No future appointments.

## 2022-07-18 DIAGNOSIS — M54.41 ACUTE RIGHT-SIDED LOW BACK PAIN WITH RIGHT-SIDED SCIATICA: ICD-10-CM

## 2022-07-19 ENCOUNTER — TELEPHONE (OUTPATIENT)
Dept: INTERNAL MEDICINE CLINIC | Age: 52
End: 2022-07-19

## 2022-07-19 NOTE — TELEPHONE ENCOUNTER
----- Message from August Daugherty sent at 7/14/2022  4:31 PM EDT -----  Subject: Message to Provider    QUESTIONS  Information for Provider? Pt needs an approved referral [address] [facility] for visit on 05/02/22   and 07/06/22. Contat pt for any further information   ---------------------------------------------------------------------------  --------------  Melissa ROWLAND  [phone]; OK to leave message on voicemail  ---------------------------------------------------------------------------  --------------  SCRIPT ANSWERS  Relationship to Patient?  Self

## 2022-07-19 NOTE — TELEPHONE ENCOUNTER
Future Appointments:  No future appointments. Last Appointment With Me:  4/12/2022     Spoke with patient she states she was given bill at [place] providers office and stated her insurance told her she needs to have specific provider listed will contact patients insurance when I get a chance for clarification.

## 2022-07-20 ENCOUNTER — PATIENT MESSAGE (OUTPATIENT)
Dept: INTERNAL MEDICINE CLINIC | Age: 52
End: 2022-07-20

## 2022-07-20 DIAGNOSIS — M54.41 ACUTE RIGHT-SIDED LOW BACK PAIN WITH RIGHT-SIDED SCIATICA: ICD-10-CM

## 2022-07-20 RX ORDER — CYCLOBENZAPRINE HCL 10 MG
10 TABLET ORAL
Qty: 30 TABLET | Refills: 1 | Status: CANCELLED | OUTPATIENT
Start: 2022-07-20

## 2022-07-20 RX ORDER — NAPROXEN 500 MG/1
500 TABLET ORAL 2 TIMES DAILY WITH MEALS
Qty: 14 TABLET | Refills: 0 | Status: CANCELLED | OUTPATIENT
Start: 2022-07-20 | End: 2022-07-27

## 2022-07-20 NOTE — TELEPHONE ENCOUNTER
Last Visit: 4/12/22 with MD Brigitte Greene  Next Appointment: none  Previous Refill Encounter(s): 7/2/22 Flexeril #30 with 1 refill, 7/10/22 Naproxen #14    Requested Prescriptions     Pending Prescriptions Disp Refills    cyclobenzaprine (FLEXERIL) 10 mg tablet 30 Tablet 1     Sig: Take 1 Tablet by mouth three (3) times daily as needed for Muscle Spasm(s). naproxen (NAPROSYN) 500 mg tablet 14 Tablet 0     Sig: Take 1 Tablet by mouth two (2) times daily (with meals) for 7 days. Then PRN         For [address] in place:   Recommendation Provided To:    Intervention Detail: New Rx: 2, reason: Patient Preference  Gap Closed?:   Intervention Accepted By:   Time Spent (min): 5

## 2022-07-22 RX ORDER — CYCLOBENZAPRINE HCL 10 MG
TABLET ORAL
Qty: 30 TABLET | Refills: 1 | OUTPATIENT
Start: 2022-07-22

## 2022-07-22 NOTE — TELEPHONE ENCOUNTER
Cyclobenzaprine refill too soon--last refilled for #30 with 1 refill 7-2-22. No future appointments.

## 2022-07-23 NOTE — TELEPHONE ENCOUNTER
Duplicate request--cyclobenzaprine refill too soon. Need to clarify naproxen refill request--messaged pt to schedule follow-up to review.

## 2022-08-01 DIAGNOSIS — M54.41 ACUTE RIGHT-SIDED LOW BACK PAIN WITH RIGHT-SIDED SCIATICA: ICD-10-CM

## 2022-08-07 DIAGNOSIS — M54.41 ACUTE RIGHT-SIDED LOW BACK PAIN WITH RIGHT-SIDED SCIATICA: ICD-10-CM

## 2022-08-08 RX ORDER — NAPROXEN 500 MG/1
TABLET ORAL
Qty: 14 TABLET | Refills: 0 | OUTPATIENT
Start: 2022-08-08

## 2022-08-08 RX ORDER — CYCLOBENZAPRINE HCL 10 MG
TABLET ORAL
Qty: 30 TABLET | Refills: 1 | Status: SHIPPED | OUTPATIENT
Start: 2022-08-08 | End: 2022-09-28

## 2022-08-08 NOTE — TELEPHONE ENCOUNTER
Refill request(s) approved--cyclobenzaprine for PRN use. Last refill for #30 with 1 refill 7-2-22.     Requested Prescriptions     Signed Prescriptions Disp Refills    cyclobenzaprine (FLEXERIL) 10 mg tablet 30 Tablet 1     Sig: TAKE 1 TABLET BY MOUTH THREE TIMES A DAY AS NEEDED FOR MUSCLE SPASMS     Authorizing Provider: Analia Moreland

## 2022-09-05 DIAGNOSIS — M54.41 ACUTE RIGHT-SIDED LOW BACK PAIN WITH RIGHT-SIDED SCIATICA: ICD-10-CM

## 2022-09-10 RX ORDER — NAPROXEN 500 MG/1
TABLET ORAL
Qty: 14 TABLET | Refills: 0 | OUTPATIENT
Start: 2022-09-10

## 2022-09-10 NOTE — TELEPHONE ENCOUNTER
Naproxen refill not approved--current or prior request.    History of gastric bypass--should not regularly use NSAIDs. MyChart message to pt.

## 2022-09-18 DIAGNOSIS — M54.41 ACUTE RIGHT-SIDED LOW BACK PAIN WITH RIGHT-SIDED SCIATICA: ICD-10-CM

## 2022-09-28 RX ORDER — CYCLOBENZAPRINE HCL 10 MG
TABLET ORAL
Qty: 30 TABLET | Refills: 1 | Status: SHIPPED | OUTPATIENT
Start: 2022-09-28

## 2022-11-07 DIAGNOSIS — M54.41 ACUTE RIGHT-SIDED LOW BACK PAIN WITH RIGHT-SIDED SCIATICA: ICD-10-CM

## 2022-11-13 RX ORDER — CYCLOBENZAPRINE HCL 10 MG
TABLET ORAL
Qty: 30 TABLET | Refills: 1 | OUTPATIENT
Start: 2022-11-13

## 2022-11-29 DIAGNOSIS — M54.41 ACUTE RIGHT-SIDED LOW BACK PAIN WITH RIGHT-SIDED SCIATICA: ICD-10-CM

## 2023-01-11 RX ORDER — CYCLOBENZAPRINE HCL 10 MG
TABLET ORAL
Qty: 30 TABLET | Refills: 1 | Status: SHIPPED | OUTPATIENT
Start: 2023-01-11 | End: 2023-01-15

## 2023-01-12 ENCOUNTER — TELEPHONE (OUTPATIENT)
Dept: INTERNAL MEDICINE CLINIC | Age: 53
End: 2023-01-12

## 2023-01-12 DIAGNOSIS — M54.41 ACUTE RIGHT-SIDED LOW BACK PAIN WITH RIGHT-SIDED SCIATICA: ICD-10-CM

## 2023-01-12 NOTE — TELEPHONE ENCOUNTER
Writer sent pt a message via [facility].   ---------------------------------------------------------  Pt left a voice message requesting the location of the pharmacy where the Flexeril 10 mg was sent. [phone]    Duplicate request: Flexeril 10 mg #30 with 1 refill was sent to [id number] on 01/11/2023.      For Pharmacy Admin Tracking Only    Program: Medication Refill  Intervention Detail: Discontinued Rx: 1, reason: Duplicate Therapy  Time Spent (min): 5      Requested Prescriptions     Pending Prescriptions Disp Refills    cyclobenzaprine (FLEXERIL) 10 mg tablet 30 Tablet 1     Sig: TAKE 1 TABLET BY MOUTH THREE TIMES A DAY AS NEEDED FOR MUSCLE SPASMS

## 2023-01-15 RX ORDER — CYCLOBENZAPRINE HCL 10 MG
TABLET ORAL
Qty: 30 TABLET | Refills: 1 | Status: SHIPPED | OUTPATIENT
Start: 2023-01-15

## 2023-01-23 DIAGNOSIS — F41.0 PANIC DISORDER: ICD-10-CM

## 2023-01-23 DIAGNOSIS — F33.2 SEVERE EPISODE OF RECURRENT MAJOR DEPRESSIVE DISORDER, WITHOUT PSYCHOTIC FEATURES (HCC): ICD-10-CM

## 2023-01-23 DIAGNOSIS — F43.10 PTSD (POST-TRAUMATIC STRESS DISORDER): Primary | ICD-10-CM

## 2023-02-21 DIAGNOSIS — M54.41 ACUTE RIGHT-SIDED LOW BACK PAIN WITH RIGHT-SIDED SCIATICA: ICD-10-CM

## 2023-03-02 RX ORDER — CYCLOBENZAPRINE HCL 10 MG
TABLET ORAL
Qty: 30 TABLET | Refills: 0 | Status: SHIPPED | OUTPATIENT
Start: 2023-03-02

## 2023-03-03 NOTE — TELEPHONE ENCOUNTER
Refill request(s) approved--cyclobenzaprine--30-day supply with 0 refill(s). MyChart message to pt--to schedule follow-up appt.

## 2023-04-13 DIAGNOSIS — M54.41 ACUTE RIGHT-SIDED LOW BACK PAIN WITH RIGHT-SIDED SCIATICA: ICD-10-CM

## 2023-04-13 RX ORDER — CYCLOBENZAPRINE HCL 10 MG
TABLET ORAL
Qty: 30 TABLET | Refills: 0 | Status: CANCELLED | OUTPATIENT
Start: 2023-04-13

## 2023-04-13 RX ORDER — NAPROXEN 500 MG/1
TABLET ORAL
Qty: 14 TABLET | Refills: 0 | Status: CANCELLED | OUTPATIENT
Start: 2023-04-13

## 2023-07-10 ENCOUNTER — TELEPHONE (OUTPATIENT)
Age: 53
End: 2023-07-10

## 2023-07-10 NOTE — TELEPHONE ENCOUNTER
Pt says she has thrush infection symptoms for a week. Pt is suffering and wd like a CB soonest for advice and RX if possible; pt having a hard time getting around w/out a car and would appreciate a CB as soon as possible; soonest VV OV 7/25/23 (not scheduled). [phone].

## 2023-07-11 NOTE — TELEPHONE ENCOUNTER
Pt states she has been experiencing thrush for the past week with the symptoms of burning sensation in mouth and white patches on the front of tongue. Pt doesn't note any other symptoms.  Pt would like prescription to be sen to the pharmacy   Sandra Navas LPN

## 2024-03-26 ENCOUNTER — TELEPHONE (OUTPATIENT)
Age: 54
End: 2024-03-26

## 2024-03-26 NOTE — TELEPHONE ENCOUNTER
Pt is asking for another referral for Dr. Carranza Pt states she has been going to Dr. Carranza for 2 years and really like her pt asking for as many visit as she can get.